# Patient Record
Sex: FEMALE | Race: WHITE | Employment: UNEMPLOYED | URBAN - NONMETROPOLITAN AREA
[De-identification: names, ages, dates, MRNs, and addresses within clinical notes are randomized per-mention and may not be internally consistent; named-entity substitution may affect disease eponyms.]

---

## 2020-02-24 ENCOUNTER — HOSPITAL ENCOUNTER (OUTPATIENT)
Age: 30
Discharge: HOME | End: 2020-02-24
Attending: OBSTETRICS & GYNECOLOGY | Admitting: OBSTETRICS & GYNECOLOGY
Payer: COMMERCIAL

## 2020-02-24 VITALS
HEART RATE: 79 BPM | SYSTOLIC BLOOD PRESSURE: 157 MMHG | DIASTOLIC BLOOD PRESSURE: 97 MMHG | TEMPERATURE: 99.3 F | RESPIRATION RATE: 18 BRPM

## 2020-02-24 LAB
ABO: NORMAL
ALBUMIN SERPL-MCNC: 2.5 G/DL (ref 3.5–5.1)
ALP BLD-CCNC: 152 U/L (ref 38–126)
ALT SERPL-CCNC: 41 U/L (ref 11–66)
ANTIBODY SCREEN: NORMAL
AST SERPL-CCNC: 45 U/L (ref 5–40)
BACTERIA: ABNORMAL /HPF
BASOPHILS # BLD: 0.4 %
BASOPHILS ABSOLUTE: 0 THOU/MM3 (ref 0–0.1)
BILIRUB SERPL-MCNC: < 0.2 MG/DL (ref 0.3–1.2)
BILIRUBIN DIRECT: < 0.2 MG/DL (ref 0–0.3)
BILIRUBIN URINE: NEGATIVE
BLOOD, URINE: ABNORMAL
CASTS 2: ABNORMAL /LPF
CASTS UA: ABNORMAL /LPF
CHARACTER, URINE: CLEAR
COLOR: YELLOW
CREAT SERPL-MCNC: 0.7 MG/DL (ref 0.4–1.2)
CREATININE URINE: 100.2 MG/DL
CRYSTALS, UA: ABNORMAL
EOSINOPHIL # BLD: 0.9 %
EOSINOPHILS ABSOLUTE: 0.1 THOU/MM3 (ref 0–0.4)
EPITHELIAL CELLS, UA: ABNORMAL /HPF
ERYTHROCYTE [DISTWIDTH] IN BLOOD BY AUTOMATED COUNT: 13.2 % (ref 11.5–14.5)
ERYTHROCYTE [DISTWIDTH] IN BLOOD BY AUTOMATED COUNT: 46.4 FL (ref 35–45)
GFR SERPL CREATININE-BSD FRML MDRD: > 90 ML/MIN/1.73M2
GLUCOSE URINE: NEGATIVE MG/DL
HCT VFR BLD CALC: 37.9 % (ref 37–47)
HEMOGLOBIN: 12.6 GM/DL (ref 12–16)
IMMATURE GRANS (ABS): 0.03 THOU/MM3 (ref 0–0.07)
IMMATURE GRANULOCYTES: 0.3 %
KETONES, URINE: NEGATIVE
LEUKOCYTE ESTERASE, URINE: NEGATIVE
LYMPHOCYTES # BLD: 22.3 %
LYMPHOCYTES ABSOLUTE: 2.4 THOU/MM3 (ref 1–4.8)
MCH RBC QN AUTO: 31.7 PG (ref 26–33)
MCHC RBC AUTO-ENTMCNC: 33.2 GM/DL (ref 32.2–35.5)
MCV RBC AUTO: 95.5 FL (ref 81–99)
MISCELLANEOUS 2: ABNORMAL
MONOCYTES # BLD: 6.1 %
MONOCYTES ABSOLUTE: 0.7 THOU/MM3 (ref 0.4–1.3)
NITRITE, URINE: NEGATIVE
NUCLEATED RED BLOOD CELLS: 0 /100 WBC
PH UA: 6 (ref 5–9)
PLATELET # BLD: 118 THOU/MM3 (ref 130–400)
PMV BLD AUTO: ABNORMAL FL (ref 9.4–12.4)
PROT/CREAT RATIO, UR: 7.75
PROTEIN UA: >= 300
PROTEIN, URINE: 776.5 MG/DL
RBC # BLD: 3.97 MILL/MM3 (ref 4.2–5.4)
RBC URINE: ABNORMAL /HPF
RENAL EPITHELIAL, UA: ABNORMAL
RH FACTOR: NORMAL
SEG NEUTROPHILS: 70 %
SEGMENTED NEUTROPHILS ABSOLUTE COUNT: 7.6 THOU/MM3 (ref 1.8–7.7)
SPECIFIC GRAVITY, URINE: 1.02 (ref 1–1.03)
TOTAL PROTEIN: 5.3 G/DL (ref 6.1–8)
UROBILINOGEN, URINE: 0.2 EU/DL (ref 0–1)
WBC # BLD: 10.9 THOU/MM3 (ref 4.8–10.8)
WBC UA: ABNORMAL /HPF
YEAST: ABNORMAL

## 2020-02-24 PROCEDURE — 2500000003 HC RX 250 WO HCPCS: Performed by: OBSTETRICS & GYNECOLOGY

## 2020-02-24 PROCEDURE — 96365 THER/PROPH/DIAG IV INF INIT: CPT

## 2020-02-24 PROCEDURE — 36415 COLL VENOUS BLD VENIPUNCTURE: CPT

## 2020-02-24 PROCEDURE — 6360000002 HC RX W HCPCS

## 2020-02-24 PROCEDURE — 51701 INSERT BLADDER CATHETER: CPT

## 2020-02-24 PROCEDURE — 6370000000 HC RX 637 (ALT 250 FOR IP): Performed by: OBSTETRICS & GYNECOLOGY

## 2020-02-24 PROCEDURE — 80076 HEPATIC FUNCTION PANEL: CPT

## 2020-02-24 PROCEDURE — 2580000003 HC RX 258: Performed by: OBSTETRICS & GYNECOLOGY

## 2020-02-24 PROCEDURE — 96361 HYDRATE IV INFUSION ADD-ON: CPT

## 2020-02-24 PROCEDURE — 2709999900 HC NON-CHARGEABLE SUPPLY

## 2020-02-24 PROCEDURE — 86850 RBC ANTIBODY SCREEN: CPT

## 2020-02-24 PROCEDURE — 86901 BLOOD TYPING SEROLOGIC RH(D): CPT

## 2020-02-24 PROCEDURE — 96360 HYDRATION IV INFUSION INIT: CPT

## 2020-02-24 PROCEDURE — 6360000002 HC RX W HCPCS: Performed by: OBSTETRICS & GYNECOLOGY

## 2020-02-24 PROCEDURE — 96376 TX/PRO/DX INJ SAME DRUG ADON: CPT

## 2020-02-24 PROCEDURE — 85025 COMPLETE CBC W/AUTO DIFF WBC: CPT

## 2020-02-24 PROCEDURE — 86900 BLOOD TYPING SEROLOGIC ABO: CPT

## 2020-02-24 PROCEDURE — 96372 THER/PROPH/DIAG INJ SC/IM: CPT

## 2020-02-24 PROCEDURE — 96374 THER/PROPH/DIAG INJ IV PUSH: CPT

## 2020-02-24 PROCEDURE — 81001 URINALYSIS AUTO W/SCOPE: CPT

## 2020-02-24 PROCEDURE — 82565 ASSAY OF CREATININE: CPT

## 2020-02-24 PROCEDURE — 82570 ASSAY OF URINE CREATININE: CPT

## 2020-02-24 PROCEDURE — 84156 ASSAY OF PROTEIN URINE: CPT

## 2020-02-24 RX ORDER — MAGNESIUM SULFATE IN WATER 40 MG/ML
4 INJECTION, SOLUTION INTRAVENOUS ONCE
Status: COMPLETED | OUTPATIENT
Start: 2020-02-24 | End: 2020-02-24

## 2020-02-24 RX ORDER — LABETALOL 20 MG/4 ML (5 MG/ML) INTRAVENOUS SYRINGE
10 ONCE
Status: COMPLETED | OUTPATIENT
Start: 2020-02-24 | End: 2020-02-24

## 2020-02-24 RX ORDER — LABETALOL 20 MG/4 ML (5 MG/ML) INTRAVENOUS SYRINGE
20 ONCE
Status: COMPLETED | OUTPATIENT
Start: 2020-02-24 | End: 2020-02-24

## 2020-02-24 RX ORDER — MAGNESIUM SULFATE IN WATER 40 MG/ML
INJECTION, SOLUTION INTRAVENOUS
Status: COMPLETED
Start: 2020-02-24 | End: 2020-02-24

## 2020-02-24 RX ORDER — SODIUM CHLORIDE, SODIUM LACTATE, POTASSIUM CHLORIDE, CALCIUM CHLORIDE 600; 310; 30; 20 MG/100ML; MG/100ML; MG/100ML; MG/100ML
INJECTION, SOLUTION INTRAVENOUS CONTINUOUS
Status: DISCONTINUED | OUTPATIENT
Start: 2020-02-24 | End: 2020-02-25 | Stop reason: HOSPADM

## 2020-02-24 RX ORDER — BETAMETHASONE SODIUM PHOSPHATE AND BETAMETHASONE ACETATE 3; 3 MG/ML; MG/ML
INJECTION, SUSPENSION INTRA-ARTICULAR; INTRALESIONAL; INTRAMUSCULAR; SOFT TISSUE
Status: COMPLETED
Start: 2020-02-24 | End: 2020-02-24

## 2020-02-24 RX ORDER — LABETALOL 200 MG/1
200 TABLET, FILM COATED ORAL ONCE
Status: COMPLETED | OUTPATIENT
Start: 2020-02-24 | End: 2020-02-24

## 2020-02-24 RX ORDER — BETAMETHASONE SODIUM PHOSPHATE AND BETAMETHASONE ACETATE 3; 3 MG/ML; MG/ML
12 INJECTION, SUSPENSION INTRA-ARTICULAR; INTRALESIONAL; INTRAMUSCULAR; SOFT TISSUE ONCE
Status: COMPLETED | OUTPATIENT
Start: 2020-02-24 | End: 2020-02-24

## 2020-02-24 RX ORDER — LABETALOL 20 MG/4 ML (5 MG/ML) INTRAVENOUS SYRINGE
40 ONCE
Status: COMPLETED | OUTPATIENT
Start: 2020-02-24 | End: 2020-02-24

## 2020-02-24 RX ADMIN — BETAMETHASONE SODIUM PHOSPHATE AND BETAMETHASONE ACETATE 12 MG: 3; 3 INJECTION, SUSPENSION INTRA-ARTICULAR; INTRALESIONAL; INTRAMUSCULAR at 20:24

## 2020-02-24 RX ADMIN — MAGNESIUM SULFATE HEPTAHYDRATE 4 G: 40 INJECTION, SOLUTION INTRAVENOUS at 22:03

## 2020-02-24 RX ADMIN — LABETALOL 20 MG/4 ML (5 MG/ML) INTRAVENOUS SYRINGE 40 MG: at 22:05

## 2020-02-24 RX ADMIN — LABETALOL 20 MG/4 ML (5 MG/ML) INTRAVENOUS SYRINGE 10 MG: at 20:54

## 2020-02-24 RX ADMIN — LABETALOL 20 MG/4 ML (5 MG/ML) INTRAVENOUS SYRINGE 10 MG: at 20:26

## 2020-02-24 RX ADMIN — LABETALOL HYDROCHLORIDE 200 MG: 200 TABLET, FILM COATED ORAL at 22:30

## 2020-02-24 RX ADMIN — MAGNESIUM SULFATE IN WATER 4 G: 40 INJECTION, SOLUTION INTRAVENOUS at 22:03

## 2020-02-24 RX ADMIN — BETAMETHASONE SODIUM PHOSPHATE AND BETAMETHASONE ACETATE 12 MG: 3; 3 INJECTION, SUSPENSION INTRA-ARTICULAR; INTRALESIONAL; INTRAMUSCULAR; SOFT TISSUE at 20:24

## 2020-02-24 RX ADMIN — LABETALOL 20 MG/4 ML (5 MG/ML) INTRAVENOUS SYRINGE 20 MG: at 21:37

## 2020-02-24 RX ADMIN — SODIUM CHLORIDE, POTASSIUM CHLORIDE, SODIUM LACTATE AND CALCIUM CHLORIDE: 600; 310; 30; 20 INJECTION, SOLUTION INTRAVENOUS at 20:20

## 2020-02-25 NOTE — FLOWSHEET NOTE
Dr. Alysa Sung updated on patient blood pressures. After 20 minutes of receiving 10mg of IV labetalol patient blood pressures were 188/112 and 184/110. Order received for more IV labetalol. RN will update in 20 min if blood pressures continue to stay high.

## 2020-02-25 NOTE — FLOWSHEET NOTE
Dr. Nicolle Peterson updated on patient blood pressures, medication has still not helped. Order received for more labetalol.

## 2020-02-25 NOTE — H&P
Department of Gynecology  History and Physical  Beni Sánchez D.O.        CHIEF COMPLAINT:   Severe preeclampsia    HISTORY OF PRESENT ILLNESS:                     The patient is a 26 y.o.  female with no significant past medical history who presented to the office today for a routine OB appt. On arrival to her appt, her BP was noted to be 160/84 with a repeat 158/100. She denies any HA or visual changes. She does note that her swelling started last week but did not call. She also started having upper back pain about a week that wrapped around. She did see the chiropractor today and now has more chest discomfort with breathing. Good FM. On arrival to L&D, BP was 202/120. She has received a total of 40mg IV labetalol. Past Medical History:        Diagnosis Date    Abnormal Pap smear of cervix     several years ago, has had normal since.  Anemia     taking iron. Past Surgical History:        Procedure Laterality Date    DILATION AND CURETTAGE OF UTERUS  2019    DILATION AND CURETTAGE OF UTERUS  2018       Past Gynecological History:      OB History    Para Term  AB Living   3       2     SAB TAB Ectopic Molar Multiple Live Births   2                # Outcome Date GA Lbr Lewis/2nd Weight Sex Delivery Anes PTL Lv   3 Current            2 SAB 19 8w0d      N    1 SAB 18 8w0d              Medications Prior to Admission:   Medications Prior to Admission: ferrous fumarate-vitamin c (XUAN-SEQUELS) 65-25 MG TBCR CR tablet, Take 1 tablet by mouth daily (with breakfast)  Prenatal Multivit-Min-Fe-FA (PRE-CHARLEEN PO), Take by mouth  Pseudoephedrine-APAP  MG TABS, Take 81 mg by mouth  NONFORMULARY, Indications: Birth control    Allergies:  Patient has no known allergies. Social History:  TOBACCO:   reports that she has never smoked. She has never used smokeless tobacco.  ETOH:   reports previous alcohol use.   DRUGS:   reports no history of drug use.    Family History:   History reviewed. No pertinent family history. REVIEW OF SYSTEMS:      Constitutional:  negative  Eyes:  negative for  double vision, blind spots and irritation  Ears, nose, mouth, throat, and face:  negative for  hearing loss, earaches and sore throat  Respiratory:  negative for  dry cough, wheezing and chest pain  Cardiovascular:  Postive for chest pain,  negative for dyspnea, palpitations  Gastrointestinal:  negative for change in bowel habits, abdominal mass and regurgitation  Genitourinary:  negative for frequency, dysuria and hesitancy  Integument/breast:  negative for rash, skin color change and pruritus  Hematologic/lymphatic:  negative for easy bruising, bleeding and petechiae  Allergic/Immunologic:  negative for recurrent infections, hay fever and anaphylaxis  Endocrine:  negative for heat intolerance, cold intolerance and change in bowel habits  Musculoskeletal:  negative for  myalgias, arthralgias and stiff joints  Neurological:  negative for dizziness, tremor and weakness  Behavior/Psych:  negative for poor concentration, depressed mood and agitated    PHYSICAL EXAM:    Vitals:  BP (!) 187/110   Pulse 74   Temp 99.3 °F (37.4 °C)   Resp 18     CONSTITUTIONAL:  awake, alert, cooperative, no apparent distress, and appears stated age  EYES:  Lids and lashes normal, pupils equal, round and reactive to light, extra ocular muscles intact, sclera clear, conjunctiva normal  ENT:  Normocephalic, without obvious abnormality, atraumatic, sinuses nontender on palpation, external ears without lesions, oral pharynx with moist mucus membranes. NECK:  Supple, symmetrical, trachea midline, no adenopathy, thyroid symmetric, not enlarged and no tenderness, skin normal  LUNGS:  No increased work of breathing, good air exchange, clear to auscultation bilaterally, no crackles or wheezing  CARDIOVASCULAR:  Normal apical impulse, regular rate and rhythm, 2-3+ pitting edema.     ABDOMEN:  No scars, normal bowel sounds, soft, non-distended, non-tender, no masses palpated, no hepatosplenomegally  MUSCULOSKELETAL:  There is no redness, warmth, or swelling of the joints. Full range of motion noted. Motor strength is 5 out of 5 all extremities bilaterally. Tone is normal.  NEUROLOGIC:  Awake, alert, oriented to name, place and time. Cranial nerves II-XII are grossly intact. Motor is 5 out of 5 bilaterally.     SKIN:  no bruising or bleeding, normal skin color, texture, turgor and no redness, warmth, or swelling    DATA:  Urine with Reflexed Micro [[id number]] (Abnormal)    Collected: 02/24/20 2030    Updated: 02/24/20 2224     Glucose, Ur NEGATIVE mg/dl    Bilirubin Urine NEGATIVE    Ketones, Urine NEGATIVE    Specific Gravity, Urine 1.020    Blood, Urine MODERATEAbnormal     pH, UA 6.0    Protein, UA >= 300Abnormal     Urobilinogen, Urine 0.2 eu/dl    Nitrite, Urine NEGATIVE    Leukocyte Esterase, Urine NEGATIVE    Color, UA YELLOW    Character, Urine CLEAR    RBC, UA 5-10 /hpf    WBC, UA 2-4 /hpf    Epi Cells 5-10 /hpf    Bacteria, UA NONE SEEN /hpf    Casts UA 8-15 HYALINE /lpf    Crystals NONE SEEN    Renal Epithelial, Urine NONE    Yeast, UA NONE SEEN    CASTS 2 0-4 FINE GRAN /lpf    MISCELLANEOUS 2 NONE SEEN    Comment: Performed at [address] [place] [address], [address]      Protein / creatinine ratio, urine [[id number]]    Collected: 02/24/20 2030    Updated: 02/24/20 2144    Specimen Source: Urine, clean catch     Protein, Urine 776.5 mg/dl    Creatinine, Urine 100.2 mg/dl    Prot/Creat Ratio, Ur 7.75    Comment: Performed at [address] [place] [address] [[id number]]    Collected: 02/24/20 2000    Updated: 02/24/20 2127    Specimen Source: Blood     ABO O    Rh Factor POS    Antibody Screen NEG    Comment: Performed at [address] [place] [address], [address]      Glomerular Filtration Rate, Estimated [[id number]] Collected: 02/24/20 2004    Updated: 02/24/20 2056     Est, Glom Filt Rate >90 ml/min/1.73m2    Comment: Stage Description                    GFR, ml/min/1.73 m2    -   At increased risk               > or = 60 (with chronic                                        kidney disease risk factors)    1   Normal or increased GFR         > or = 90    2   Mildly or decreased GFR         60 - 89    3   Moderately decreased GFR        30 - 59    4   Severely decreased GFR          15 - 29    5   Kidney failure                  <15 (or dialysis)   Estimated GFR calculated using abbreviated MDRD formula as   recommended by [employer].  Calculation based   upon serum creatinine and adjusted for age, gender & race. Rosio. Internal Med., Vol. 139 (2) pg 137-147.    Performed at [address] [place] [address], [address]       Hepatic Function Panel [[id number]] (Abnormal)    Collected: 02/24/20 2004    Updated: 02/24/20 2055    Specimen Source: Blood     Alb 2.5Low  g/dL    Total Bilirubin <0.2Low  mg/dL    Bilirubin, Direct <0.2 mg/dL    Alkaline Phosphatase 152High  U/L    AST 45High  U/L    ALT 41 U/L    Total Protein 5.3Low  g/dL    Comment: Performed at [address] [place] [address], [address]      Creatinine, Serum [[id number]]    Collected: 02/24/20 2004    Updated: 02/24/20 2055    Specimen Source: Blood     CREATININE 0.7 mg/dL    Comment: Performed at [address] [place] [address], [address]      CBC Auto Differential [[id number]] (Abnormal)    Collected: 02/24/20 2004    Updated: 02/24/20 2044    Specimen Source: Blood     WBC 10.9High  thou/mm3    RBC 3.97Low  mill/mm3    Hemoglobin 12.6 gm/dl    Hematocrit 37.9 %    MCV 95.5 fL    MCH 31.7 pg    MCHC 33.2 gm/dl    RDW-CV 13.2 %    RDW-SD 46.4High  fL    Platelets 197QAV  thou/mm3    MPV ---- fL    Seg Neutrophils 70.0 %    Lymphocytes 22.3 %    Monocytes 6.1 %    Eosinophils 0.9 %    Basophils 0.4 %    Immature Granulocytes 0.3 %    Segs Absolute 7.6 thou/mm3    Lymphocytes Absolute 2.4 thou/mm3    Monocytes Absolute 0.7 thou/mm3    Eosinophils Absolute 0.1 thou/mm3    Basophils Absolute 0.0 thou/mm3    Immature Grans (Abs) 0.03 thou/mm3    nRBC 0 /100 wbc    Comment: Performed at [facility], [address]            ASSESSMENT AND PLAN:      Active Problems:  IUP @ 28+0 weeks  Severe Pre-eclampsia  Hx of 2 miscarriage    Discussed case with Dr Chester Rosa, MFM @ [address] [place] [address]. IV Labetolol 40mg given immediately by RN as Dr Liza Roque and I were talking. Magnesium Sulfate 4mg bolus then 2mg/hr started. Betamethasone 12mg IM given. Dr De Jesus Mom agreed to accept transfer of pt.  He recommended an oral dose of 200mg Labetalol prior to transfer       Rupal Pruitt D.O.

## 2020-02-25 NOTE — FLOWSHEET NOTE
Dr. Ld Reinoso updated on blood pressures, she is on her way in to discuss plan of care with the patient.

## 2020-02-25 NOTE — FLOWSHEET NOTE
Report given to [facility] AND [facility] over the phone.

## 2020-02-25 NOTE — FLOWSHEET NOTE
Dr. St Parents updated on patient blood pressures. Patient has no c/o headache or blurry vision, no n/v. Patient does have plus 2 pitting edema in both lower extremities, reflexes are 3+, no clonus. Patient c/o upper back pain that she is rating a 2/10 and states it started about a week ago. Patient stated she got adjusted at the chiropractor today and her chest \"kind of hurts\". Patient described it as feeling a little hard to take a breath but the patient thinks it is related to her back pain. Abdomen is soft and non tender. Baby has moderate variability, patient has no c/o ctx, no leakage of fluid, no bleeding and positive fetal movement. Orders received.